# Patient Record
Sex: FEMALE | Race: WHITE | Employment: FULL TIME | URBAN - METROPOLITAN AREA
[De-identification: names, ages, dates, MRNs, and addresses within clinical notes are randomized per-mention and may not be internally consistent; named-entity substitution may affect disease eponyms.]

---

## 2017-03-20 ENCOUNTER — EMPLOYEE WELLNESS (OUTPATIENT)
Dept: OTHER | Age: 32
End: 2017-03-20

## 2017-03-20 LAB
CHOLESTEROL: 173 MG/DL
GLUCOSE BLD-MCNC: 97 MG/DL (ref 70–140)
HDLC SERPL-MCNC: 50 MG/DL
LDL CHOLESTEROL CALCULATED: 106 MG/DL
PATIENT FASTING?: YES
TRIGL SERPL-MCNC: 84 MG/DL

## 2017-06-30 ENCOUNTER — HOSPITAL ENCOUNTER (OUTPATIENT)
Dept: LAB | Age: 32
Discharge: HOME | End: 2017-06-30
Attending: OBSTETRICS & GYNECOLOGY | Admitting: OBSTETRICS & GYNECOLOGY

## 2017-07-01 LAB — HIV SCREEN: NON REACTIVE

## 2017-10-11 ENCOUNTER — HOSPITAL ENCOUNTER (OUTPATIENT)
Dept: LAB | Age: 32
Discharge: HOME | End: 2017-10-11
Attending: OBSTETRICS & GYNECOLOGY | Admitting: OBSTETRICS & GYNECOLOGY

## 2017-10-11 LAB
AMOUNT GLUCOSE GIVEN: 50 GRAMS
GLUCOSE TOLERANCE TEST 1 HOUR: 130 MG/DL
HCT VFR BLD CALC: 35.1 % (ref 37–47)
HEMOGLOBIN: 12.1 GM/DL (ref 12.5–16)
MCH RBC QN AUTO: 32.6 PG (ref 27–31)
MCHC RBC AUTO-ENTMCNC: 34.5 % (ref 32–36)
MCV RBC AUTO: 94.6 FL (ref 78–100)
PDW BLD-RTO: 12.9 % (ref 11.7–14.9)
PLATELET # BLD: 254 K/CU MM (ref 140–440)
PMV BLD AUTO: 10.6 FL (ref 7.5–11.1)
RBC # BLD: 3.71 M/CU MM (ref 4.2–5.4)
WBC # BLD: 11.6 K/CU MM (ref 4–10.5)

## 2017-12-27 ENCOUNTER — HOSPITAL ENCOUNTER (OUTPATIENT)
Dept: LAB | Age: 32
Discharge: HOME | End: 2017-12-27
Attending: OBSTETRICS & GYNECOLOGY | Admitting: OBSTETRICS & GYNECOLOGY

## 2017-12-27 ENCOUNTER — HOSPITAL ENCOUNTER (OUTPATIENT)
Dept: OTHER | Age: 32
Discharge: HOME | End: 2017-12-27
Attending: OBSTETRICS & GYNECOLOGY | Admitting: OBSTETRICS & GYNECOLOGY

## 2017-12-27 LAB
ALT SERPL-CCNC: 13 U/L (ref 10–40)
AST SERPL-CCNC: 20 IU/L (ref 15–37)
BASOPHILS ABSOLUTE: 0 K/CU MM
BASOPHILS RELATIVE PERCENT: 0.4 % (ref 0–1)
CREAT SERPL-MCNC: 0.6 MG/DL (ref 0.6–1.1)
DIFFERENTIAL TYPE: ABNORMAL
EOSINOPHILS ABSOLUTE: 0.1 K/CU MM
EOSINOPHILS RELATIVE PERCENT: 0.8 % (ref 0–3)
GFR AFRICAN AMERICAN: >60 ML/MIN/1.73M2
GFR NON-AFRICAN AMERICAN: >60 ML/MIN/1.73M2
HCT VFR BLD CALC: 36.5 % (ref 37–47)
HEMOGLOBIN: 12.2 GM/DL (ref 12.5–16)
IMMATURE NEUTROPHIL %: 0.6 % (ref 0–0.43)
LYMPHOCYTES ABSOLUTE: 1.4 K/CU MM
LYMPHOCYTES RELATIVE PERCENT: 12.9 % (ref 24–44)
MCH RBC QN AUTO: 31.9 PG (ref 27–31)
MCHC RBC AUTO-ENTMCNC: 33.4 % (ref 32–36)
MCV RBC AUTO: 95.3 FL (ref 78–100)
MONOCYTES ABSOLUTE: 0.9 K/CU MM
MONOCYTES RELATIVE PERCENT: 8.2 % (ref 0–4)
NUCLEATED RBC %: 0 %
PDW BLD-RTO: 13.2 % (ref 11.7–14.9)
PLATELET # BLD: 235 K/CU MM (ref 140–440)
PMV BLD AUTO: 11.9 FL (ref 7.5–11.1)
RBC # BLD: 3.83 M/CU MM (ref 4.2–5.4)
SEGMENTED NEUTROPHILS ABSOLUTE COUNT: 8.4 K/CU MM
SEGMENTED NEUTROPHILS RELATIVE PERCENT: 77.1 % (ref 36–66)
TOTAL IMMATURE NEUTOROPHIL: 0.06 K/CU MM
TOTAL NUCLEATED RBC: 0 K/CU MM
URIC ACID: 4.4 MG/DL (ref 2.6–6)
WBC # BLD: 10.9 K/CU MM (ref 4–10.5)

## 2018-01-01 LAB
CULTURE: NORMAL
REPORT STATUS: NORMAL
REQUEST PROBLEM: NORMAL
SPECIMEN: NORMAL

## 2018-01-04 ENCOUNTER — HOSPITAL ENCOUNTER (OUTPATIENT)
Dept: OTHER | Age: 33
Discharge: HOME | End: 2018-01-04
Attending: OBSTETRICS & GYNECOLOGY | Admitting: OBSTETRICS & GYNECOLOGY

## 2018-01-04 LAB — HEPATITIS B SURFACE ANTIGEN: NON REACTIVE

## 2018-01-05 ENCOUNTER — HOSPITAL ENCOUNTER (OUTPATIENT)
Dept: OTHER | Age: 33
Discharge: HOME | End: 2018-01-05
Attending: OBSTETRICS & GYNECOLOGY | Admitting: OBSTETRICS & GYNECOLOGY

## 2018-01-05 LAB — RPR: NON REACTIVE

## 2018-01-06 PROBLEM — O34.219 PREVIOUS CESAREAN DELIVERY AFFECTING PREGNANCY: Status: ACTIVE | Noted: 2018-01-06

## 2018-01-06 LAB — RUBELLA ANTIBODY IGG: 20.5

## 2018-03-20 VITALS — WEIGHT: 172 LBS | BODY MASS INDEX: 30.47 KG/M2

## 2018-04-20 ENCOUNTER — EMPLOYEE WELLNESS (OUTPATIENT)
Dept: INTERNAL MEDICINE CLINIC | Age: 33
End: 2018-04-20

## 2018-04-20 LAB
CHOLESTEROL: 167 MG/DL
GLUCOSE BLD-MCNC: 89 MG/DL (ref 70–99)
HDLC SERPL-MCNC: 65 MG/DL
LDL CHOLESTEROL CALCULATED: 94 MG/DL
PATIENT FASTING?: YES
TRIGL SERPL-MCNC: 42 MG/DL

## 2018-05-02 VITALS — BODY MASS INDEX: 28.67 KG/M2 | WEIGHT: 167 LBS

## 2019-03-13 ENCOUNTER — EMPLOYEE WELLNESS (OUTPATIENT)
Dept: OTHER | Age: 34
End: 2019-03-13

## 2019-03-13 LAB
CHOLESTEROL: 150 MG/DL
GLUCOSE BLD-MCNC: 93 MG/DL (ref 70–99)
HDLC SERPL-MCNC: 54 MG/DL
LDL CHOLESTEROL CALCULATED: 87 MG/DL
PATIENT FASTING?: YES
TRIGL SERPL-MCNC: 46 MG/DL

## 2019-03-20 VITALS — BODY MASS INDEX: 28.15 KG/M2 | WEIGHT: 164 LBS

## 2019-04-08 ENCOUNTER — HOSPITAL ENCOUNTER (OUTPATIENT)
Age: 34
Setting detail: SPECIMEN
Discharge: HOME | End: 2019-04-08
Payer: COMMERCIAL

## 2019-04-08 PROCEDURE — 88142 CYTOPATH C/V THIN LAYER: CPT

## 2019-04-08 PROCEDURE — 87624 HPV HI-RISK TYP POOLED RSLT: CPT

## 2019-04-13 LAB
HPV, HIGH RISK OTHER: NEGATIVE
HPV, HIGH RISK OTHER: NORMAL

## 2021-07-06 LAB
CHOLESTEROL: 186 MG/DL
GLUCOSE BLD-MCNC: 84 MG/DL (ref 70–99)
HDLC SERPL-MCNC: 60 MG/DL
LDL CHOLESTEROL CALCULATED: 108 MG/DL
PATIENT FASTING?: YES
TRIGL SERPL-MCNC: 90 MG/DL

## 2021-07-28 ENCOUNTER — OFFICE VISIT (OUTPATIENT)
Dept: FAMILY MEDICINE CLINIC | Age: 36
End: 2021-07-28
Payer: COMMERCIAL

## 2021-07-28 VITALS
HEIGHT: 64 IN | WEIGHT: 177 LBS | HEART RATE: 85 BPM | BODY MASS INDEX: 30.22 KG/M2 | TEMPERATURE: 97.2 F | DIASTOLIC BLOOD PRESSURE: 82 MMHG | OXYGEN SATURATION: 98 % | SYSTOLIC BLOOD PRESSURE: 132 MMHG

## 2021-07-28 DIAGNOSIS — M25.412 PAIN AND SWELLING OF LEFT SHOULDER: Primary | ICD-10-CM

## 2021-07-28 DIAGNOSIS — M25.512 PAIN AND SWELLING OF LEFT SHOULDER: Primary | ICD-10-CM

## 2021-07-28 PROCEDURE — 99203 OFFICE O/P NEW LOW 30 MIN: CPT | Performed by: NURSE PRACTITIONER

## 2021-07-28 RX ORDER — VITAMIN B COMPLEX
1 CAPSULE ORAL DAILY
COMMUNITY

## 2021-07-28 ASSESSMENT — ENCOUNTER SYMPTOMS
WHEEZING: 0
NAUSEA: 0
SHORTNESS OF BREATH: 0
ABDOMINAL PAIN: 0
BACK PAIN: 0
RECTAL PAIN: 0

## 2021-08-05 ENCOUNTER — HOSPITAL ENCOUNTER (OUTPATIENT)
Dept: ULTRASOUND IMAGING | Age: 36
Discharge: HOME | End: 2021-08-05
Payer: COMMERCIAL

## 2021-08-05 DIAGNOSIS — M25.512 PAIN AND SWELLING OF LEFT SHOULDER: ICD-10-CM

## 2021-08-05 DIAGNOSIS — M25.412 PAIN AND SWELLING OF LEFT SHOULDER: ICD-10-CM

## 2021-08-05 PROCEDURE — 76882 US LMTD JT/FCL EVL NVASC XTR: CPT

## 2021-08-18 ENCOUNTER — TELEPHONE (OUTPATIENT)
Dept: FAMILY MEDICINE CLINIC | Age: 36
End: 2021-08-18

## 2021-08-18 NOTE — TELEPHONE ENCOUNTER
----- Message from DEDRA Parnell - CNP sent at 8/18/2021  9:36 AM EDT -----  I will refer pt to gen surg for removal.  If she has a preference please let me know.

## 2021-08-19 DIAGNOSIS — D17.20 LIPOMA OF UPPER EXTREMITY, UNSPECIFIED LATERALITY: Primary | ICD-10-CM

## 2021-08-19 NOTE — TELEPHONE ENCOUNTER
Referral placed for Dr Kallie Burroughs for [place] office unless she is able to go to [facility].

## 2021-08-20 ENCOUNTER — TELEPHONE (OUTPATIENT)
Dept: SURGERY | Age: 36
End: 2021-08-20

## 2021-09-09 ENCOUNTER — OFFICE VISIT (OUTPATIENT)
Dept: SURGERY | Age: 36
End: 2021-09-09
Payer: COMMERCIAL

## 2021-09-09 VITALS
BODY MASS INDEX: 30.29 KG/M2 | HEIGHT: 64 IN | SYSTOLIC BLOOD PRESSURE: 122 MMHG | HEART RATE: 85 BPM | DIASTOLIC BLOOD PRESSURE: 80 MMHG | WEIGHT: 177.4 LBS | OXYGEN SATURATION: 95 %

## 2021-09-09 DIAGNOSIS — D17.22 LIPOMA OF LEFT UPPER EXTREMITY: Primary | ICD-10-CM

## 2021-09-09 PROCEDURE — 99203 OFFICE O/P NEW LOW 30 MIN: CPT | Performed by: SURGERY

## 2021-09-09 ASSESSMENT — PATIENT HEALTH QUESTIONNAIRE - PHQ9
SUM OF ALL RESPONSES TO PHQ QUESTIONS 1-9: 0
SUM OF ALL RESPONSES TO PHQ9 QUESTIONS 1 & 2: 0
1. LITTLE INTEREST OR PLEASURE IN DOING THINGS: 0
2. FEELING DOWN, DEPRESSED OR HOPELESS: 0

## 2021-09-15 ENCOUNTER — TELEPHONE (OUTPATIENT)
Dept: SURGERY | Age: 36
End: 2021-09-15

## 2021-09-15 DIAGNOSIS — Z01.818 PRE-OP TESTING: Primary | ICD-10-CM

## 2021-09-15 NOTE — TELEPHONE ENCOUNTER
SPOKE TO  [address] Meza (LEFT SHOULDER LIPOMA EXCISION) SCHEDULED @ [facility].  NOTIFIED OF DATES, TIMES AND LOCATION    PHONE ASSESSMENT   COVID - WILL BE DONE AT HOSPITAL LAB 9/24/21  SURGERY - 9/28/21 @ 830  P/O - 10/7/21 @ 1000    NPO AFTER MIDNIGHT  HOLD BLOOD THINNERS - NOT ON BLOOD THINNERS

## 2021-09-16 ENCOUNTER — TELEPHONE (OUTPATIENT)
Dept: SURGERY | Age: 36
End: 2021-09-16

## 2021-09-16 NOTE — TELEPHONE ENCOUNTER
Spoke with fabrice at [facility] CPT code 53-69-10-18 does not require prior authorization [facility]#[id number]

## 2021-09-22 ENCOUNTER — HOSPITAL ENCOUNTER (OUTPATIENT)
Age: 36
Discharge: HOME | End: 2021-09-22
Payer: COMMERCIAL

## 2021-09-22 PROCEDURE — U0003 INFECTIOUS AGENT DETECTION BY NUCLEIC ACID (DNA OR RNA); SEVERE ACUTE RESPIRATORY SYNDROME CORONAVIRUS 2 (SARS-COV-2) (CORONAVIRUS DISEASE [COVID-19]), AMPLIFIED PROBE TECHNIQUE, MAKING USE OF HIGH THROUGHPUT TECHNOLOGIES AS DESCRIBED BY CMS-2020-01-R: HCPCS

## 2021-09-22 PROCEDURE — U0005 INFEC AGEN DETEC AMPLI PROBE: HCPCS

## 2021-09-22 NOTE — PROGRESS NOTES
Surgery is on 9/28/2021 you will be called with an arrival time the day before surgery. 1. Do not eat or drink anything after midnight - unless instructed by your doctor prior to surgery. This includes                   no water, chewing gum or mints. 2. Follow your directions as prescribed by the doctor for your procedure and medications. 3. Check with your Doctor regarding stopping Plavix, Coumadin, Lovenox,Effient,Pradaxa,Xarelto, Fragmin or other blood thinners and                   follow their instructions. 4. Do not smoke, and do not drink any alcoholic beverages 24 hours prior to surgery. This includes NA Beer. 5. You may brush your teeth and gargle the morning of surgery. DO NOT SWALLOW WATER   6. You MUST make arrangements for a responsible adult to take you home after your surgery and be able to check on you every couple                   hours for the day. You will not be allowed to leave alone or drive yourself home. It is strongly suggested someone stay with you the first 24                   hrs. Your surgery will be cancelled if you do not have a ride home. 7. Please wear simple, loose fitting clothing to the hospital.  Leonor Cabrera not bring valuables (money, credit cards, checkbooks, etc.) Do not wear any                   makeup (including no eye makeup) or nail polish on your fingers or toes. 8. DO NOT wear any jewelry or piercings on day of surgery. All body piercing jewelry must be removed. 9. If you have dentures, they will be removed before going to the OR; we will provide you a container. If you wear contact lenses or glasses,                  they will be removed; please bring a case for them. 10. If you  have a Living Will and Durable Power of  for Healthcare, please bring in a copy. 11. Please bring picture ID,  insurance card, paperwork from the doctors office    (H & P, Consent, & card for implantable devices). 12. Take a shower the night before or morning of your procedure, do not apply any lotion, oil or powder. 13. Wear a mask covering your nose & mouth when entering the hospital. Have your covid-19 test performed within 2-7 days of your                  surgery. Quarantine yourself after the test until after your surgery.

## 2021-09-23 LAB
SARS-COV-2: NOT DETECTED
SOURCE: NORMAL

## 2021-09-27 ENCOUNTER — ANESTHESIA EVENT (OUTPATIENT)
Dept: OPERATING ROOM | Age: 36
End: 2021-09-27
Payer: COMMERCIAL

## 2021-09-27 ASSESSMENT — ENCOUNTER SYMPTOMS
CONSTIPATION: 0
SORE THROAT: 0
ANAL BLEEDING: 0
APNEA: 0
BACK PAIN: 0
COLOR CHANGE: 0
EYE ITCHING: 0
EYE REDNESS: 0
CHOKING: 0
RECTAL PAIN: 0
PHOTOPHOBIA: 0
STRIDOR: 0

## 2021-09-27 NOTE — H&P
Problem Relation Age of Onset    High Blood Pressure Father     Vision Loss Father     Heart Disease Father           Review of Systems  Review of Systems   Constitutional: Negative for chills and fever. HENT: Negative for ear pain, mouth sores, sore throat and tinnitus. Eyes: Negative for photophobia, redness and itching. Respiratory: Negative for apnea, choking and stridor. Cardiovascular: Negative for chest pain and palpitations. Gastrointestinal: Negative for anal bleeding, constipation and rectal pain. Endocrine: Negative for polydipsia. Genitourinary: Negative for enuresis, flank pain and hematuria. Musculoskeletal: Positive for arthralgias and joint swelling. Negative for back pain and myalgias. Skin: Negative for color change and pallor. Allergic/Immunologic: Negative for environmental allergies. Neurological: Negative for syncope and speech difficulty. Psychiatric/Behavioral: Negative for confusion and hallucinations. Objective:     No data found. Physical Exam  Constitutional:       Appearance: She is well-developed. HENT:      Head: Normocephalic. Eyes:      Pupils: Pupils are equal, round, and reactive to light. Cardiovascular:      Rate and Rhythm: Normal rate. Pulmonary:      Effort: Pulmonary effort is normal.   Abdominal:      General: There is no distension. Palpations: Abdomen is soft. There is no mass. Tenderness: There is no abdominal tenderness. There is no guarding or rebound. Musculoskeletal:         General: Normal range of motion. Cervical back: Normal range of motion and neck supple. Comments: Lipoma to left posterior shoulder   Skin:     General: Skin is warm. Neurological:      Mental Status: She is alert and oriented to person, place, and time.          Data Review  CBC:   Lab Results   Component Value Date    WBC 12.9 01/07/2018    RBC 3.20 01/07/2018     BMP:   Lab Results   Component Value Date    GLUCOSE 84 07/06/2021    CREATININE 0.6 12/27/2017       Assessment:     Left shoulder lipoma    Plan: We will proceed with left shoulder lipoma excision under MAC. Risks, benefits and alternatives of the procedure have been discussed with the patient who is elected to proceed. The patient was counseled at length about the risks of amanda Covid-19 during their perioperative period and any recovery window from their procedure. The patient was made aware that amanda Covid-19  may worsen their prognosis for recovering from their procedure  and lend to a higher morbidity and/or mortality risk. All material risks, benefits, and reasonable alternatives including postponing the procedure were discussed. The patient does wish to proceed with the procedure at this time.           Em Pozo PA-C

## 2021-09-27 NOTE — ANESTHESIA PRE PROCEDURE
Department of Anesthesiology  Preprocedure Note       Name:  Kaley Choi   Age:  38 y.o.  :  1985                                          MRN:  [id number]         Date:  2021      Surgeon: Evangelista Herrera):  Marin Hsu MD    Procedure: Procedure(s):  LEFT SHOULDER LIPOMA EXCISION    Medications prior to admission:   Prior to Admission medications    Medication Sig Start Date End Date Taking? Authorizing Provider   Multiple Vitamin (MULTI-VITAMIN DAILY PO) Take by mouth    Historical Provider, MD   b complex vitamins capsule Take 1 capsule by mouth daily    Historical Provider, MD   ibuprofen (ADVIL;MOTRIN) 800 MG tablet Take 1 tablet by mouth every 8 hours as needed for Pain 18   David Shaw MD   calcium carbonate (TUMS) 500 MG chewable tablet Take 1 tablet by mouth daily    Historical Provider, MD   prenatal vitamin (PRENATAL-S) 27-0.8 MG TABS Take 1 tablet by mouth daily. Patient not taking: Reported on 2021    Historical Provider, MD       Current medications:    No current facility-administered medications for this encounter. Current Outpatient Medications   Medication Sig Dispense Refill    Multiple Vitamin (MULTI-VITAMIN DAILY PO) Take by mouth      b complex vitamins capsule Take 1 capsule by mouth daily      ibuprofen (ADVIL;MOTRIN) 800 MG tablet Take 1 tablet by mouth every 8 hours as needed for Pain 90 tablet 1    calcium carbonate (TUMS) 500 MG chewable tablet Take 1 tablet by mouth daily      prenatal vitamin (PRENATAL-S) 27-0.8 MG TABS Take 1 tablet by mouth daily. (Patient not taking: Reported on 2021)         Allergies:  No Known Allergies    Problem List:    Patient Active Problem List   Diagnosis Code    Supervision of normal first pregnancy Z34.00    Labor and delivery, indication for care O75.9    Previous  delivery affecting pregnancy O34.219       Past Medical History:  History reviewed. No pertinent past medical history.     Past summary reviewed  Airway:         Dental:          Pulmonary:                             ROS comment: Former Smoker   Cardiovascular:Negative CV ROS             Beta Blocker:  Not on Beta Blocker         Neuro/Psych:   Negative Neuro/Psych ROS              GI/Hepatic/Renal: Neg GI/Hepatic/Renal ROS            Endo/Other: Negative Endo/Other ROS                    Abdominal:             Vascular: negative vascular ROS. Other Findings:             Anesthesia Plan      MAC     ASA 1       Induction: intravenous. DEDRA Silva - CRNA   9/27/2021         Pre Anesthesia Assessment complete.  Chart reviewed on 9/27/2021

## 2021-09-28 ENCOUNTER — HOSPITAL ENCOUNTER (OUTPATIENT)
Age: 36
Setting detail: OUTPATIENT SURGERY
Discharge: HOME | End: 2021-09-28
Attending: SURGERY | Admitting: SURGERY
Payer: COMMERCIAL

## 2021-09-28 ENCOUNTER — ANESTHESIA (OUTPATIENT)
Dept: OPERATING ROOM | Age: 36
End: 2021-09-28
Payer: COMMERCIAL

## 2021-09-28 VITALS
DIASTOLIC BLOOD PRESSURE: 79 MMHG | BODY MASS INDEX: 30.22 KG/M2 | HEART RATE: 86 BPM | OXYGEN SATURATION: 100 % | RESPIRATION RATE: 18 BRPM | WEIGHT: 177 LBS | HEIGHT: 64 IN | SYSTOLIC BLOOD PRESSURE: 130 MMHG | TEMPERATURE: 98.1 F

## 2021-09-28 VITALS — DIASTOLIC BLOOD PRESSURE: 82 MMHG | SYSTOLIC BLOOD PRESSURE: 117 MMHG | OXYGEN SATURATION: 91 %

## 2021-09-28 DIAGNOSIS — D17.22 LIPOMA OF LEFT SHOULDER: ICD-10-CM

## 2021-09-28 LAB — PREGNANCY TEST URINE, POC: NEGATIVE

## 2021-09-28 PROCEDURE — 23073 EXC SHOULDER TUM DEEP 5 CM/>: CPT | Performed by: PHYSICIAN ASSISTANT

## 2021-09-28 PROCEDURE — 7100000010 HC PHASE II RECOVERY - FIRST 15 MIN: Performed by: SURGERY

## 2021-09-28 PROCEDURE — 3600000012 HC SURGERY LEVEL 2 ADDTL 15MIN: Performed by: SURGERY

## 2021-09-28 PROCEDURE — 81025 URINE PREGNANCY TEST: CPT

## 2021-09-28 PROCEDURE — 23073 EXC SHOULDER TUM DEEP 5 CM/>: CPT | Performed by: SURGERY

## 2021-09-28 PROCEDURE — 3700000000 HC ANESTHESIA ATTENDED CARE: Performed by: SURGERY

## 2021-09-28 PROCEDURE — 3700000001 HC ADD 15 MINUTES (ANESTHESIA): Performed by: SURGERY

## 2021-09-28 PROCEDURE — APPNB45 APP NON BILLABLE 31-45 MINUTES: Performed by: PHYSICIAN ASSISTANT

## 2021-09-28 PROCEDURE — 2580000003 HC RX 258: Performed by: ANESTHESIOLOGY

## 2021-09-28 PROCEDURE — 2709999900 HC NON-CHARGEABLE SUPPLY: Performed by: SURGERY

## 2021-09-28 PROCEDURE — 2500000003 HC RX 250 WO HCPCS: Performed by: NURSE ANESTHETIST, CERTIFIED REGISTERED

## 2021-09-28 PROCEDURE — 2500000003 HC RX 250 WO HCPCS: Performed by: SURGERY

## 2021-09-28 PROCEDURE — 7100000011 HC PHASE II RECOVERY - ADDTL 15 MIN: Performed by: SURGERY

## 2021-09-28 PROCEDURE — 6360000002 HC RX W HCPCS: Performed by: NURSE ANESTHETIST, CERTIFIED REGISTERED

## 2021-09-28 PROCEDURE — 88304 TISSUE EXAM BY PATHOLOGIST: CPT | Performed by: PATHOLOGY

## 2021-09-28 PROCEDURE — 3600000002 HC SURGERY LEVEL 2 BASE: Performed by: SURGERY

## 2021-09-28 RX ORDER — SODIUM CHLORIDE, SODIUM LACTATE, POTASSIUM CHLORIDE, CALCIUM CHLORIDE 600; 310; 30; 20 MG/100ML; MG/100ML; MG/100ML; MG/100ML
INJECTION, SOLUTION INTRAVENOUS CONTINUOUS
Status: DISCONTINUED | OUTPATIENT
Start: 2021-09-28 | End: 2021-09-28 | Stop reason: HOSPADM

## 2021-09-28 RX ORDER — FENTANYL CITRATE 50 UG/ML
INJECTION, SOLUTION INTRAMUSCULAR; INTRAVENOUS PRN
Status: DISCONTINUED | OUTPATIENT
Start: 2021-09-28 | End: 2021-09-28 | Stop reason: SDUPTHER

## 2021-09-28 RX ORDER — ACETAMINOPHEN AND CODEINE PHOSPHATE 300; 30 MG/1; MG/1
1 TABLET ORAL EVERY 6 HOURS PRN
Qty: 5 TABLET | Refills: 0 | Status: SHIPPED | OUTPATIENT
Start: 2021-09-28 | End: 2021-10-01

## 2021-09-28 RX ORDER — LIDOCAINE HYDROCHLORIDE 10 MG/ML
INJECTION, SOLUTION EPIDURAL; INFILTRATION; INTRACAUDAL; PERINEURAL
Status: COMPLETED | OUTPATIENT
Start: 2021-09-28 | End: 2021-09-28

## 2021-09-28 RX ORDER — PROPOFOL 10 MG/ML
INJECTION, EMULSION INTRAVENOUS PRN
Status: DISCONTINUED | OUTPATIENT
Start: 2021-09-28 | End: 2021-09-28 | Stop reason: SDUPTHER

## 2021-09-28 RX ORDER — LIDOCAINE HYDROCHLORIDE 20 MG/ML
INJECTION, SOLUTION INTRAVENOUS PRN
Status: DISCONTINUED | OUTPATIENT
Start: 2021-09-28 | End: 2021-09-28 | Stop reason: SDUPTHER

## 2021-09-28 RX ORDER — KETAMINE HCL 50MG/ML(1)
SYRINGE (ML) INTRAVENOUS PRN
Status: DISCONTINUED | OUTPATIENT
Start: 2021-09-28 | End: 2021-09-28 | Stop reason: SDUPTHER

## 2021-09-28 RX ADMIN — PROPOFOL 400 MG: 10 INJECTION, EMULSION INTRAVENOUS at 08:12

## 2021-09-28 RX ADMIN — SODIUM CHLORIDE, POTASSIUM CHLORIDE, SODIUM LACTATE AND CALCIUM CHLORIDE: 600; 310; 30; 20 INJECTION, SOLUTION INTRAVENOUS at 07:34

## 2021-09-28 RX ADMIN — LIDOCAINE HYDROCHLORIDE 100 MG: 20 INJECTION, SOLUTION INTRAVENOUS at 08:12

## 2021-09-28 RX ADMIN — FENTANYL CITRATE 25 MCG: 50 INJECTION, SOLUTION INTRAMUSCULAR; INTRAVENOUS at 08:28

## 2021-09-28 RX ADMIN — Medication 25 MG: at 08:28

## 2021-09-28 RX ADMIN — FENTANYL CITRATE 50 MCG: 50 INJECTION, SOLUTION INTRAMUSCULAR; INTRAVENOUS at 08:12

## 2021-09-28 RX ADMIN — FENTANYL CITRATE 25 MCG: 50 INJECTION, SOLUTION INTRAMUSCULAR; INTRAVENOUS at 08:20

## 2021-09-28 RX ADMIN — Medication 25 MG: at 08:23

## 2021-09-28 ASSESSMENT — PULMONARY FUNCTION TESTS
PIF_VALUE: 0
PIF_VALUE: 1
PIF_VALUE: 0
PIF_VALUE: 0
PIF_VALUE: 1
PIF_VALUE: 0
PIF_VALUE: 1
PIF_VALUE: 0
PIF_VALUE: 0
PIF_VALUE: 1
PIF_VALUE: 1
PIF_VALUE: 0
PIF_VALUE: 1
PIF_VALUE: 0
PIF_VALUE: 0
PIF_VALUE: 1
PIF_VALUE: 1
PIF_VALUE: 0
PIF_VALUE: 1
PIF_VALUE: 0
PIF_VALUE: 1
PIF_VALUE: 0
PIF_VALUE: 0

## 2021-09-28 ASSESSMENT — PAIN SCALES - GENERAL
PAINLEVEL_OUTOF10: 0
PAINLEVEL_OUTOF10: 0

## 2021-09-28 ASSESSMENT — PAIN - FUNCTIONAL ASSESSMENT: PAIN_FUNCTIONAL_ASSESSMENT: 0-10

## 2021-09-28 NOTE — PROGRESS NOTES
0850 Pt. Brought back to unit from OR. Report received from [facility], [facility]. Vitals obtained and WNL. Surgical site is C/D/I and PORTER. Spouse at bedside. Pt. Drinking water appropriately. Educated on use of call light, call light in reach. 0900 Handoff given to SYMONE Domingo.

## 2021-09-28 NOTE — ANESTHESIA PRE PROCEDURE
Department of Anesthesiology  Preprocedure Note       Name:  Jovana Murray   Age:  38 y.o.  :  1985                                          MRN:  [id number]         Date:  2021      Surgeon: Justus Malagon):  Ron Wasserman MD    Procedure: Procedure(s):  LEFT SHOULDER LIPOMA EXCISION    Medications prior to admission:   Prior to Admission medications    Medication Sig Start Date End Date Taking? Authorizing Provider   Multiple Vitamin (MULTI-VITAMIN DAILY PO) Take by mouth   Yes Historical Provider, MD   b complex vitamins capsule Take 1 capsule by mouth daily   Yes Historical Provider, MD   ibuprofen (ADVIL;MOTRIN) 800 MG tablet Take 1 tablet by mouth every 8 hours as needed for Pain 18  Yes Debbie Olivia MD   calcium carbonate (TUMS) 500 MG chewable tablet Take 1 tablet by mouth daily   Yes Historical Provider, MD   prenatal vitamin (PRENATAL-S) 27-0.8 MG TABS Take 1 tablet by mouth daily. Patient not taking: Reported on 2021    Historical Provider, MD       Current medications:    Current Facility-Administered Medications   Medication Dose Route Frequency Provider Last Rate Last Admin    lactated ringers infusion   IntraVENous Continuous [place] Cassis,  mL/hr at 21 0734 New Bag at 21 0734       Allergies:  No Known Allergies    Problem List:    Patient Active Problem List   Diagnosis Code    Supervision of normal first pregnancy Z34.00    Labor and delivery, indication for care O75.9    Previous  delivery affecting pregnancy O34.219       Past Medical History:  History reviewed. No pertinent past medical history. Past Surgical History:        Procedure Laterality Date    CERVIX BIOPSY       SECTION, LOW TRANSVERSE  2014    WISDOM TOOTH EXTRACTION         Social History:    Social History     Tobacco Use    Smoking status: Former Smoker    Smokeless tobacco: Never Used   Substance Use Topics    Alcohol use:  Yes Comment: occas                                Counseling given: Not Answered      Vital Signs (Current):   Vitals:    09/22/21 1101 09/28/21 0715   BP:  132/89   Pulse:  92   Resp:  18   Temp:  37.1 °C (98.7 °F)   TempSrc:  Tympanic   SpO2:  98%   Weight: 177 lb (80.3 kg) 177 lb (80.3 kg)   Height: 5' 4\" (1.626 m) 5' 4\" (1.626 m)                                              BP Readings from Last 3 Encounters:   09/28/21 132/89   09/09/21 122/80   07/28/21 132/82       NPO Status: Time of last liquid consumption: 2230                        Time of last solid consumption: 2100                        Date of last liquid consumption: 09/27/21                        Date of last solid food consumption: 09/27/21    BMI:   Wt Readings from Last 3 Encounters:   09/28/21 177 lb (80.3 kg)   09/09/21 177 lb 6.4 oz (80.5 kg)   07/28/21 177 lb (80.3 kg)     Body mass index is 30.38 kg/m². CBC:   Lab Results   Component Value Date    WBC 12.9 01/07/2018    RBC 3.20 01/07/2018    HGB 10.4 01/07/2018    HCT 31.0 01/07/2018    MCV 96.9 01/07/2018    RDW 13.2 01/07/2018     01/07/2018       CMP:   Lab Results   Component Value Date    CREATININE 0.6 12/27/2017    GFRAA >60 12/27/2017    LABGLOM >60 12/27/2017    GLUCOSE 84 07/06/2021    AST 20 12/27/2017    ALT 13 12/27/2017       POC Tests: No results for input(s): POCGLU, POCNA, POCK, POCCL, POCBUN, POCHEMO, POCHCT in the last 72 hours.     Coags: No results found for: PROTIME, INR, APTT    HCG (If Applicable):   Lab Results   Component Value Date    PREGTESTUR NEGATIVE 09/28/2021        ABGs: No results found for: PHART, PO2ART, AVA2LUP, POU1FDG, BEART, L3UWHPGL     Type & Screen (If Applicable):  No results found for: LABABO, LABRH    Drug/Infectious Status (If Applicable):  No results found for: HIV, HEPCAB    COVID-19 Screening (If Applicable):   Lab Results   Component Value Date    COVID19 NOT DETECTED 09/22/2021           Anesthesia Evaluation  Patient summary reviewed  Airway: Mallampati: II  TM distance: >3 FB   Neck ROM: full  Mouth opening: > = 3 FB Dental:          Pulmonary:normal exam                              ROS comment: Former Smoker   Cardiovascular:Negative CV ROS             Beta Blocker:  Not on Beta Blocker         Neuro/Psych:   Negative Neuro/Psych ROS              GI/Hepatic/Renal: Neg GI/Hepatic/Renal ROS            Endo/Other: Negative Endo/Other ROS                    Abdominal:             Vascular: negative vascular ROS. Other Findings:             Anesthesia Plan      MAC     ASA 1       Induction: intravenous. Anesthetic plan and risks discussed with patient. DEDRA Rosas - CRNA   9/28/2021         Pre Anesthesia Assessment complete.  Chart reviewed on 9/28/2021

## 2021-09-28 NOTE — ANESTHESIA POSTPROCEDURE EVALUATION
Department of Anesthesiology  Postprocedure Note    Patient: Marge Norman  MRN: [id number]  YOB: 1985  Date of evaluation: 9/28/2021  Time:  8:57 AM     Procedure Summary     Date: 09/28/21 Room / Location: [address]    Anesthesia Start: [address] Anesthesia Stop: 5238    Procedure: LEFT SHOULDER LIPOMA EXCISION (Left Shoulder) Diagnosis:       Lipoma of left shoulder      (Lipoma of left shoulder [D17.22])    Surgeons: Daly Arcos MD Responsible Provider: Radha Contreras MD    Anesthesia Type: MAC ASA Status: 1          Anesthesia Type: MAC    Jodie Phase I:      Jodie Phase II:      Last vitals: Reviewed and per EMR flowsheets.        Anesthesia Post Evaluation    Patient location during evaluation: bedside  Patient participation: complete - patient participated  Level of consciousness: awake and alert  Pain score: 0  Airway patency: patent  Nausea & Vomiting: no nausea and no vomiting  Complications: no  Cardiovascular status: blood pressure returned to baseline  Respiratory status: acceptable  Hydration status: euvolemic

## 2021-09-28 NOTE — BRIEF OP NOTE
Brief Postoperative Note      Patient: Kaley Choi  YOB: 1985  MRN: [id number]    Date of Procedure: 9/28/2021    Pre-Op Diagnosis: Lipoma of left shoulder [D17.22]    Post-Op Diagnosis: Same       Procedure(s):  LEFT SHOULDER LIPOMA EXCISION    Surgeon(s):  Marin Hsu MD    Assistant:  Physician Assistant: Cathie Lowe PA-C    Anesthesia: Monitor Anesthesia Care    Estimated Blood Loss (mL): Minimal    Complications: None    Specimens:   ID Type Source Tests Collected by Time Destination   A : LEFT SHOULDER LIPOMA Tissue Tissue SURGICAL PATHOLOGY Marin Hsu MD 9/28/2021 3174        Implants:  * No implants in log *      Drains: * No LDAs found *    Findings: As Above    Electronically signed by Cathie Lowe PA-C on 9/28/2021 at 8:49 AM

## 2021-09-28 NOTE — PROGRESS NOTES
0900-report from [facility], pt alert, denies further need   0909- discharge instructions reviewed w/ pt and pt family, no questions at this time   5- pt dressing   1415- pt discharged via car w/ family member driving

## 2021-09-29 NOTE — OP NOTE
Aaron Granados  1985 9/29/21        Pre-operative Diagnosis:  Large left shoulder lipoma    Post-operative Diagnosis:  Same    Procedure:   1. Excision of lipoma from left shoulder measuring 13  Cm subfascial     2. Two layer closure 13 cm wound    Surgeon: Ayana Garza MD      First Assistant: Mary Stern PA-C  The  Use of a first assistant was necessary for the proper positioning, prepping, and draping of the patient, as well as the safe and expeditious execution of the case and closure of skin and subcutaneous tissues. Anesthesia: MAC /lidocaine    ASA Class: 2    Findings: same    Estimated Blood Loss:  Minimal                  Specimens: large lipoma             Complications:  None; patient tolerated the procedure well. Disposition: PACU - hemodynamically stable. Condition: stable      The patient was seen again in the Holding Room. The risks, benefits, complications, treatment options, and expected outcomes were discussed with the patient. There was concurrence with the proposed plan, and informed consent was obtained. The site of surgery was properly noted/marked. The patient was taken to the Operating Room, identified as Aaron Granados, and the procedure verified. A Time Out was held and the above information confirmed. The patient was brought to the operating room and positioned supine. After induction of anesthesia, the  Left shoulder and back was prepped and draped in standard sterile fashion. 1% lidocaine was infiltrated and an elliptical incision was made to remove the large subfascial lipoma with surrounding normal tissue. The specimen measured 13 cm leaving 13 cm wound defect. Hemostasis was acquired using Bovie cautery. The wound irrigated and closed in two layers using 3-0 Vicryl for the deep dermal tissue and then interuppted 4-0 vicryl for the skin.       Ayana Garza MD

## 2021-10-04 ENCOUNTER — TELEPHONE (OUTPATIENT)
Dept: SURGERY | Age: 36
End: 2021-10-04

## 2021-10-07 ENCOUNTER — OFFICE VISIT (OUTPATIENT)
Dept: SURGERY | Age: 36
End: 2021-10-07

## 2021-10-07 VITALS
TEMPERATURE: 98.4 F | HEART RATE: 74 BPM | DIASTOLIC BLOOD PRESSURE: 80 MMHG | OXYGEN SATURATION: 98 % | SYSTOLIC BLOOD PRESSURE: 120 MMHG

## 2021-10-07 DIAGNOSIS — D17.22 LIPOMA OF LEFT UPPER EXTREMITY: Primary | ICD-10-CM

## 2021-10-07 PROCEDURE — 99024 POSTOP FOLLOW-UP VISIT: CPT | Performed by: PHYSICIAN ASSISTANT

## 2021-10-07 PROCEDURE — APPNB30 APP NON BILLABLE TIME 0-30 MINS: Performed by: PHYSICIAN ASSISTANT

## 2021-10-12 ASSESSMENT — ENCOUNTER SYMPTOMS
STRIDOR: 0
CHOKING: 0
COLOR CHANGE: 0
ANAL BLEEDING: 0
EYE REDNESS: 0
BACK PAIN: 0
EYE ITCHING: 0
PHOTOPHOBIA: 0
CONSTIPATION: 0
SORE THROAT: 0
APNEA: 0
RECTAL PAIN: 0

## 2021-10-12 NOTE — PROGRESS NOTES
Chief Complaint   Patient presents with    New Patient     lipoma of upper extremity, back side of left shoulder ref. . SUBJECTIVE:  HPI: Patient is herewith complaints of lipoma of the left shoulder area. Patient has had this for over 3 months and has been growing. Certain movements cause pain. Patient denies any weight loss or family history of sarcoma. He would like to have this removed and is causing some discomfort with shoulder mobility. .    I have reviewed the patient's(pertinent information to this visit) medical history, family history(scanned in  the [address] [place] [address] under \"patient questioner\"), social history and review of systems with the patient today in the office. Past Surgical History:   Procedure Laterality Date    CERVIX BIOPSY       SECTION, LOW TRANSVERSE  2014    SHOULDER SURGERY Left 2021    LEFT SHOULDER LIPOMA EXCISION performed by Lis Hammond MD at [address]       No past medical history on file.   Family History   Problem Relation Age of Onset    High Blood Pressure Father     Vision Loss Father     Heart Disease Father      Social History     Socioeconomic History    Marital status:      Spouse name: Not on file    Number of children: Not on file    Years of education: Not on file    Highest education level: Not on file   Occupational History    Not on file   Tobacco Use    Smoking status: Former Smoker    Smokeless tobacco: Never Used   Substance and Sexual Activity    Alcohol use: Yes     Comment: occas    Drug use: No    Sexual activity: Yes   Other Topics Concern    Not on file   Social History Narrative    Not on file     Social Determinants of Health     Financial Resource Strain:     Difficulty of Paying Living Expenses:    Food Insecurity:     Worried About Running Out of Food in the Last Year:     [address] in the Last Year:    Transportation Needs:     Lack of Transportation (Medical):  Lack of Transportation (Non-Medical):    Physical Activity:     Days of Exercise per Week:     Minutes of Exercise per Session:    Stress:     Feeling of Stress :    Social Connections:     Frequency of Communication with Friends and Family:     Frequency of Social Gatherings with Friends and Family:     Attends [employer] Services:     Active Member of Clubs or Organizations:     Attends Club or Organization Meetings:     Marital Status:    Intimate Partner Violence:     Fear of Current or Ex-Partner:     Emotionally Abused:     Physically Abused:     Sexually Abused:        Current Outpatient Medications   Medication Sig Dispense Refill    Multiple Vitamin (MULTI-VITAMIN DAILY PO) Take by mouth      b complex vitamins capsule Take 1 capsule by mouth daily      ibuprofen (ADVIL;MOTRIN) 800 MG tablet Take 1 tablet by mouth every 8 hours as needed for Pain 90 tablet 1    calcium carbonate (TUMS) 500 MG chewable tablet Take 1 tablet by mouth daily      prenatal vitamin (PRENATAL-S) 27-0.8 MG TABS Take 1 tablet by mouth daily. (Patient not taking: Reported on 7/28/2021)       No current facility-administered medications for this visit. No Known Allergies    Review of Systems:       Review of Systems   Constitutional: Negative for chills and fever. HENT: Negative for ear pain, mouth sores, sore throat and tinnitus. Eyes: Negative for photophobia, redness and itching. Respiratory: Negative for apnea, choking and stridor. Cardiovascular: Negative for chest pain and palpitations. Gastrointestinal: Negative for anal bleeding, constipation and rectal pain. Endocrine: Negative for polydipsia. Genitourinary: Negative for enuresis, flank pain and hematuria. Musculoskeletal: Negative for back pain, joint swelling and myalgias. Skin: Negative for color change and pallor. Allergic/Immunologic: Negative for environmental allergies.    Neurological: Negative for syncope and speech difficulty. Psychiatric/Behavioral: Negative for confusion and hallucinations. OBJECTIVE:  Physical Exam:    /80   Pulse 85   Ht 5' 4\" (1.626 m)   Wt 177 lb 6.4 oz (80.5 kg)   LMP 08/28/2021   SpO2 95%   BMI 30.45 kg/m²      Physical Exam  Constitutional:       Appearance: She is well-developed. HENT:      Head: Normocephalic. Eyes:      Pupils: Pupils are equal, round, and reactive to light. Cardiovascular:      Rate and Rhythm: Normal rate. Pulmonary:      Effort: Pulmonary effort is normal.   Abdominal:      General: There is no distension. Palpations: Abdomen is soft. There is no mass. Tenderness: There is no abdominal tenderness. There is no guarding or rebound. Musculoskeletal:         General: Normal range of motion. Arms:       Cervical back: Normal range of motion and neck supple. Skin:     General: Skin is warm. Neurological:      Mental Status: She is alert and oriented to person, place, and time. ASSESSMENT:  1. Lipoma of left upper extremity          PLAN:  Treatment: We will proceed with excision of left shoulder lipoma under monitored anesthesia. .  Patient counseled on risks, benefits, and alternatives of treatment plan at length today. Patient states an understanding and willingness to proceed with plan. No orders of the defined types were placed in this encounter. No orders of the defined types were placed in this encounter. Follow Up:  No follow-ups on file.       Mortimer Dawson, MD

## 2021-10-16 NOTE — PROGRESS NOTES
Chief Complaint   Patient presents with    Post-Op Check     PO left shoulder lipoma @ [facility]         SUBJECTIVE:  Patient presents today for her 1st post op visit following left shoulder lipoma excision. Pt reports that pain is none. Incisions: min bruising and no discharge. Some residual surgical glue intact. Past Surgical History:   Procedure Laterality Date    CERVIX BIOPSY       SECTION, LOW TRANSVERSE  2014    SHOULDER SURGERY Left 2021    LEFT SHOULDER LIPOMA EXCISION performed by Josh Jonas MD at [address]       No past medical history on file. Family History   Problem Relation Age of Onset    High Blood Pressure Father     Vision Loss Father     Heart Disease Father      Social History     Socioeconomic History    Marital status:      Spouse name: Not on file    Number of children: Not on file    Years of education: Not on file    Highest education level: Not on file   Occupational History    Not on file   Tobacco Use    Smoking status: Former Smoker    Smokeless tobacco: Never Used   Substance and Sexual Activity    Alcohol use: Yes     Comment: occas    Drug use: No    Sexual activity: Yes   Other Topics Concern    Not on file   Social History Narrative    Not on file     Social Determinants of Health     Financial Resource Strain:     Difficulty of Paying Living Expenses:    Food Insecurity:     Worried About Running Out of Food in the Last Year:     [address] in the Last Year:    Transportation Needs:     Lack of Transportation (Medical):      Lack of Transportation (Non-Medical):    Physical Activity:     Days of Exercise per Week:     Minutes of Exercise per Session:    Stress:     Feeling of Stress :    Social Connections:     Frequency of Communication with Friends and Family:     Frequency of Social Gatherings with Friends and Family:     Attends [employer] Services:     Active Member of Clubs or Organizations:     Attends Club or Organization Meetings:     Marital Status:    Intimate Partner Violence:     Fear of Current or Ex-Partner:     Emotionally Abused:     Physically Abused:     Sexually Abused:        OBJECTIVE:  Physical Exam  Constitutional:       Appearance: She is well-developed. HENT:      Head: Normocephalic. Eyes:      Pupils: Pupils are equal, round, and reactive to light. Cardiovascular:      Rate and Rhythm: Normal rate. Pulmonary:      Effort: Pulmonary effort is normal.   Abdominal:      General: There is no distension. Palpations: Abdomen is soft. There is no mass. Tenderness: There is no abdominal tenderness. There is no guarding or rebound. Musculoskeletal:         General: Normal range of motion. Arms:       Cervical back: Normal range of motion and neck supple. Comments: Left posterior shoulder incision well-healed with some residual surgical glue intact   Skin:     General: Skin is warm. Neurological:      Mental Status: She is alert and oriented to person, place, and time. Path reveals:   Final Pathologic Diagnosis:   Soft tissue, left shoulder mass, resection:   -     Mature adipose tissue, consistent with lipoma. ASSESSMENT:  Patient doing well on this post operative check. Wounds well healed. 1. Lipoma of left upper extremity        PLAN:  Normal activities with no limitations. Work: Patient is back at work and tolerating well. No orders of the defined types were placed in this encounter. No orders of the defined types were placed in this encounter. Follow Up: Return if symptoms worsen or fail to improve.     Angela Jones PA-C

## 2022-06-13 LAB
CHOLESTEROL: 155 MG/DL
GLUCOSE BLD-MCNC: 88 MG/DL (ref 70–99)
HDLC SERPL-MCNC: 45 MG/DL
LDL CHOLESTEROL CALCULATED: 96 MG/DL
PATIENT FASTING?: YES
TRIGL SERPL-MCNC: 68 MG/DL

## 2022-11-07 ENCOUNTER — OFFICE VISIT (OUTPATIENT)
Dept: OBGYN | Age: 37
End: 2022-11-07
Payer: COMMERCIAL

## 2022-11-07 ENCOUNTER — HOSPITAL ENCOUNTER (OUTPATIENT)
Age: 37
Setting detail: SPECIMEN
Discharge: HOME | End: 2022-11-07
Payer: COMMERCIAL

## 2022-11-07 VITALS
DIASTOLIC BLOOD PRESSURE: 76 MMHG | HEIGHT: 64 IN | BODY MASS INDEX: 29.71 KG/M2 | SYSTOLIC BLOOD PRESSURE: 115 MMHG | WEIGHT: 174 LBS

## 2022-11-07 DIAGNOSIS — Z11.51 SPECIAL SCREENING EXAMINATION FOR HUMAN PAPILLOMAVIRUS (HPV): ICD-10-CM

## 2022-11-07 DIAGNOSIS — Z01.419 ENCOUNTER FOR ANNUAL ROUTINE GYNECOLOGICAL EXAMINATION: Primary | ICD-10-CM

## 2022-11-07 PROCEDURE — 88142 CYTOPATH C/V THIN LAYER: CPT

## 2022-11-07 PROCEDURE — 99395 PREV VISIT EST AGE 18-39: CPT | Performed by: OBSTETRICS & GYNECOLOGY

## 2022-11-07 PROCEDURE — 87624 HPV HI-RISK TYP POOLED RSLT: CPT

## 2022-11-07 SDOH — ECONOMIC STABILITY: FOOD INSECURITY: WITHIN THE PAST 12 MONTHS, YOU WORRIED THAT YOUR FOOD WOULD RUN OUT BEFORE YOU GOT MONEY TO BUY MORE.: NEVER TRUE

## 2022-11-07 SDOH — ECONOMIC STABILITY: TRANSPORTATION INSECURITY
IN THE PAST 12 MONTHS, HAS THE LACK OF TRANSPORTATION KEPT YOU FROM MEDICAL APPOINTMENTS OR FROM GETTING MEDICATIONS?: NO

## 2022-11-07 SDOH — ECONOMIC STABILITY: FOOD INSECURITY: WITHIN THE PAST 12 MONTHS, THE FOOD YOU BOUGHT JUST DIDN'T LAST AND YOU DIDN'T HAVE MONEY TO GET MORE.: NEVER TRUE

## 2022-11-07 SDOH — ECONOMIC STABILITY: TRANSPORTATION INSECURITY
IN THE PAST 12 MONTHS, HAS LACK OF TRANSPORTATION KEPT YOU FROM MEETINGS, WORK, OR FROM GETTING THINGS NEEDED FOR DAILY LIVING?: NO

## 2022-11-07 ASSESSMENT — PATIENT HEALTH QUESTIONNAIRE - PHQ9
2. FEELING DOWN, DEPRESSED OR HOPELESS: 0
SUM OF ALL RESPONSES TO PHQ QUESTIONS 1-9: 0
SUM OF ALL RESPONSES TO PHQ9 QUESTIONS 1 & 2: 0
SUM OF ALL RESPONSES TO PHQ QUESTIONS 1-9: 0
1. LITTLE INTEREST OR PLEASURE IN DOING THINGS: 0
SUM OF ALL RESPONSES TO PHQ QUESTIONS 1-9: 0
SUM OF ALL RESPONSES TO PHQ QUESTIONS 1-9: 0
1. LITTLE INTEREST OR PLEASURE IN DOING THINGS: 0
SUM OF ALL RESPONSES TO PHQ QUESTIONS 1-9: 0
SUM OF ALL RESPONSES TO PHQ9 QUESTIONS 1 & 2: 0
SUM OF ALL RESPONSES TO PHQ QUESTIONS 1-9: 0
2. FEELING DOWN, DEPRESSED OR HOPELESS: 0

## 2022-11-07 ASSESSMENT — ENCOUNTER SYMPTOMS
RESPIRATORY NEGATIVE: 1
GASTROINTESTINAL NEGATIVE: 1
EYES NEGATIVE: 1
ALLERGIC/IMMUNOLOGIC NEGATIVE: 1

## 2022-11-07 ASSESSMENT — SOCIAL DETERMINANTS OF HEALTH (SDOH): HOW HARD IS IT FOR YOU TO PAY FOR THE VERY BASICS LIKE FOOD, HOUSING, MEDICAL CARE, AND HEATING?: NOT HARD AT ALL

## 2022-11-07 NOTE — PROGRESS NOTES
22    Gerardo Aguilar  1985    Chief Complaint   Patient presents with    Gynecologic Exam     Annual exam, Pt states she always has irregular menses, LMP 10/7/22, is sexually active, had tubal ligation, Last pap smear was 2019 normal.         The patient is a 43 y.o. female,  who presents for her annual exam.  She is menstruating normally. She is  sexually active. She is currently taking birth control. Birth control method is sterilization    She reports no gynecological symptoms. Pap smear history: Her last PAP smear was in 2019. Her results were normal.    Past Medical History:   Diagnosis Date    Abnormal Pap smear of cervix     Chlamydia     Missed      Obesity     Seasonal allergies        Past Surgical History:   Procedure Laterality Date    CERVIX BIOPSY       SECTION, LOW TRANSVERSE  2014    SHOULDER SURGERY Left 2021    LEFT SHOULDER LIPOMA EXCISION performed by Tyson Richter MD at [address] EXTRACTION         Family History   Problem Relation Age of Onset    High Blood Pressure Father     Vision Loss Father     Heart Disease Father        Social History     Tobacco Use    Smoking status: Former    Smokeless tobacco: Never   Vaping Use    Vaping Use: Never used   Substance Use Topics    Alcohol use: Yes     Comment: occas    Drug use: No       Current Outpatient Medications   Medication Sig Dispense Refill    Multiple Vitamin (MULTI-VITAMIN DAILY PO) Take by mouth      b complex vitamins capsule Take 1 capsule by mouth daily      ibuprofen (ADVIL;MOTRIN) 800 MG tablet Take 1 tablet by mouth every 8 hours as needed for Pain 90 tablet 1    calcium carbonate (TUMS) 500 MG chewable tablet Take 1 tablet by mouth daily      prenatal vitamin (PRENATAL-S) 27-0.8 MG TABS Take 1 tablet by mouth daily. (Patient not taking: Reported on 2021)       No current facility-administered medications for this visit.        No Known Allergies        Immunization History   Administered Date(s) Administered    COVID-19, PFIZER PURPLE top, DILUTE for use, (age 15 y+), 30mcg/0.3mL 12/15/2020, 2021, 2021    Influenza Virus Vaccine 10/18/2019       Review of Systems   Constitutional: Negative. Eyes: Negative. Respiratory: Negative. Cardiovascular: Negative. Gastrointestinal: Negative. Endocrine: Negative. Genitourinary: Negative. Musculoskeletal: Negative. Skin: Negative. Allergic/Immunologic: Negative. Neurological: Negative. Hematological: Negative. Psychiatric/Behavioral: Negative. /76   Ht 5' 4\" (1.626 m)   Wt 174 lb (78.9 kg)   BMI 29.87 kg/m²     Physical Exam  Exam conducted with a chaperone present. Constitutional:       Appearance: Normal appearance. HENT:      Head: Normocephalic and atraumatic. Nose: Nose normal.   Eyes:      Conjunctiva/sclera: Conjunctivae normal.   Cardiovascular:      Rate and Rhythm: Normal rate. Pulses: Normal pulses. Pulmonary:      Effort: Pulmonary effort is normal.   Chest:   Breasts:     Right: Normal.      Left: Normal.   Abdominal:      General: Abdomen is flat. Bowel sounds are normal.      Palpations: Abdomen is soft. Hernia: There is no hernia in the left inguinal area or right inguinal area. Genitourinary:     General: Normal vulva. Exam position: Lithotomy position. Labia:         Right: No rash, tenderness or lesion. Left: No rash, tenderness or lesion. Urethra: No prolapse. Vagina: Normal. No foreign body. No vaginal discharge, erythema, tenderness, bleeding, lesions or prolapsed vaginal walls. Cervix: No cervical motion tenderness, discharge, friability, lesion, erythema or cervical bleeding. Uterus: Normal. Not enlarged, not tender and no uterine prolapse. Adnexa: Right adnexa normal and left adnexa normal.        Right: No mass, tenderness or fullness. Left: No mass, tenderness or fullness. Musculoskeletal:      Cervical back: Normal range of motion and neck supple. Lymphadenopathy:      Upper Body:      Right upper body: No supraclavicular, axillary or pectoral adenopathy. Left upper body: No supraclavicular, axillary or pectoral adenopathy. Skin:     General: Skin is warm. Coloration: Skin is not ashen or cyanotic. Findings: No abrasion, abscess or bruising. Rash is not crusting or macular. Neurological:      Mental Status: She is alert and oriented to person, place, and time. No results found for this visit on 11/07/22. Assessment and Plan   Diagnosis Orders   1. Encounter for annual routine gynecological examination  PAP SMEAR      2. Special screening examination for human papillomavirus (HPV)  PAP SMEAR          Return in about 1 year (around 11/7/2023).     Julian Orellnaa MD

## 2022-11-11 LAB
HPV HIGH RISK: NOT DETECTED
HPV, GENOTYPE 16: NOT DETECTED
HPV, GENOTYPE 18: NOT DETECTED

## 2023-09-12 SDOH — HEALTH STABILITY: PHYSICAL HEALTH: ON AVERAGE, HOW MANY MINUTES DO YOU ENGAGE IN EXERCISE AT THIS LEVEL?: 150+ MIN

## 2023-09-12 SDOH — HEALTH STABILITY: PHYSICAL HEALTH: ON AVERAGE, HOW MANY DAYS PER WEEK DO YOU ENGAGE IN MODERATE TO STRENUOUS EXERCISE (LIKE A BRISK WALK)?: 4 DAYS

## 2023-09-15 ENCOUNTER — OFFICE VISIT (OUTPATIENT)
Dept: INTERNAL MEDICINE CLINIC | Age: 38
End: 2023-09-15

## 2023-09-15 VITALS
BODY MASS INDEX: 32.07 KG/M2 | HEART RATE: 80 BPM | HEIGHT: 63 IN | WEIGHT: 181 LBS | DIASTOLIC BLOOD PRESSURE: 80 MMHG | OXYGEN SATURATION: 100 % | SYSTOLIC BLOOD PRESSURE: 146 MMHG

## 2023-09-15 DIAGNOSIS — Z76.89 ENCOUNTER TO ESTABLISH CARE: Primary | ICD-10-CM

## 2023-09-15 SDOH — ECONOMIC STABILITY: FOOD INSECURITY: WITHIN THE PAST 12 MONTHS, THE FOOD YOU BOUGHT JUST DIDN'T LAST AND YOU DIDN'T HAVE MONEY TO GET MORE.: NEVER TRUE

## 2023-09-15 SDOH — ECONOMIC STABILITY: FOOD INSECURITY: WITHIN THE PAST 12 MONTHS, YOU WORRIED THAT YOUR FOOD WOULD RUN OUT BEFORE YOU GOT MONEY TO BUY MORE.: NEVER TRUE

## 2023-09-15 SDOH — ECONOMIC STABILITY: HOUSING INSECURITY
IN THE LAST 12 MONTHS, WAS THERE A TIME WHEN YOU DID NOT HAVE A STEADY PLACE TO SLEEP OR SLEPT IN A SHELTER (INCLUDING NOW)?: NO

## 2023-09-15 SDOH — ECONOMIC STABILITY: INCOME INSECURITY: HOW HARD IS IT FOR YOU TO PAY FOR THE VERY BASICS LIKE FOOD, HOUSING, MEDICAL CARE, AND HEATING?: NOT HARD AT ALL

## 2023-09-15 ASSESSMENT — ENCOUNTER SYMPTOMS
EYE REDNESS: 0
SORE THROAT: 0
CONSTIPATION: 0
NAUSEA: 0
DIARRHEA: 0
SHORTNESS OF BREATH: 0
FACIAL SWELLING: 0
COUGH: 0
STRIDOR: 0
TROUBLE SWALLOWING: 0
WHEEZING: 0
ABDOMINAL PAIN: 0
VOMITING: 0
EYE PAIN: 0
EYE DISCHARGE: 0
CHEST TIGHTNESS: 0
CHOKING: 0
RHINORRHEA: 0
COLOR CHANGE: 0

## 2023-09-15 ASSESSMENT — PATIENT HEALTH QUESTIONNAIRE - PHQ9
2. FEELING DOWN, DEPRESSED OR HOPELESS: 0
SUM OF ALL RESPONSES TO PHQ QUESTIONS 1-9: 0
SUM OF ALL RESPONSES TO PHQ QUESTIONS 1-9: 0
SUM OF ALL RESPONSES TO PHQ9 QUESTIONS 1 & 2: 0
SUM OF ALL RESPONSES TO PHQ QUESTIONS 1-9: 0
1. LITTLE INTEREST OR PLEASURE IN DOING THINGS: 0
SUM OF ALL RESPONSES TO PHQ QUESTIONS 1-9: 0

## 2023-09-15 ASSESSMENT — VISUAL ACUITY: OU: 1

## 2023-11-11 SDOH — ECONOMIC STABILITY: INCOME INSECURITY: HOW HARD IS IT FOR YOU TO PAY FOR THE VERY BASICS LIKE FOOD, HOUSING, MEDICAL CARE, AND HEATING?: NOT HARD AT ALL

## 2023-11-11 SDOH — ECONOMIC STABILITY: FOOD INSECURITY: WITHIN THE PAST 12 MONTHS, THE FOOD YOU BOUGHT JUST DIDN'T LAST AND YOU DIDN'T HAVE MONEY TO GET MORE.: NEVER TRUE

## 2023-11-11 SDOH — ECONOMIC STABILITY: FOOD INSECURITY: WITHIN THE PAST 12 MONTHS, YOU WORRIED THAT YOUR FOOD WOULD RUN OUT BEFORE YOU GOT MONEY TO BUY MORE.: NEVER TRUE

## 2023-11-14 ENCOUNTER — OFFICE VISIT (OUTPATIENT)
Dept: OBGYN | Age: 38
End: 2023-11-14
Payer: COMMERCIAL

## 2023-11-14 VITALS
BODY MASS INDEX: 32.25 KG/M2 | HEIGHT: 63 IN | WEIGHT: 182 LBS | SYSTOLIC BLOOD PRESSURE: 158 MMHG | DIASTOLIC BLOOD PRESSURE: 92 MMHG

## 2023-11-14 DIAGNOSIS — Z12.31 SCREENING MAMMOGRAM FOR BREAST CANCER: ICD-10-CM

## 2023-11-14 DIAGNOSIS — R03.0 ELEVATED BLOOD PRESSURE READING: ICD-10-CM

## 2023-11-14 DIAGNOSIS — Z01.419 ENCOUNTER FOR ANNUAL ROUTINE GYNECOLOGICAL EXAMINATION: Primary | ICD-10-CM

## 2023-11-14 PROCEDURE — 99395 PREV VISIT EST AGE 18-39: CPT | Performed by: OBSTETRICS & GYNECOLOGY

## 2023-11-14 ASSESSMENT — ENCOUNTER SYMPTOMS
GASTROINTESTINAL NEGATIVE: 1
RESPIRATORY NEGATIVE: 1
ALLERGIC/IMMUNOLOGIC NEGATIVE: 1
EYES NEGATIVE: 1

## 2024-08-16 LAB
CHOLEST SERPL-MCNC: 151 MG/DL
GLUCOSE SERPL-MCNC: 85 MG/DL (ref 70–99)
HDLC SERPL-MCNC: 58 MG/DL
LDLC SERPL CALC-MCNC: 71 MG/DL
PATIENT FASTING?: YES
TRIGL SERPL-MCNC: 110 MG/DL

## 2024-11-18 SDOH — ECONOMIC STABILITY: FOOD INSECURITY: WITHIN THE PAST 12 MONTHS, THE FOOD YOU BOUGHT JUST DIDN'T LAST AND YOU DIDN'T HAVE MONEY TO GET MORE.: NEVER TRUE

## 2024-11-18 SDOH — ECONOMIC STABILITY: FOOD INSECURITY: WITHIN THE PAST 12 MONTHS, YOU WORRIED THAT YOUR FOOD WOULD RUN OUT BEFORE YOU GOT MONEY TO BUY MORE.: NEVER TRUE

## 2024-11-18 SDOH — ECONOMIC STABILITY: INCOME INSECURITY: HOW HARD IS IT FOR YOU TO PAY FOR THE VERY BASICS LIKE FOOD, HOUSING, MEDICAL CARE, AND HEATING?: NOT HARD AT ALL

## 2024-11-21 ENCOUNTER — OFFICE VISIT (OUTPATIENT)
Dept: OBGYN | Age: 39
End: 2024-11-21
Payer: COMMERCIAL

## 2024-11-21 VITALS
HEIGHT: 63 IN | WEIGHT: 187 LBS | SYSTOLIC BLOOD PRESSURE: 183 MMHG | BODY MASS INDEX: 33.13 KG/M2 | HEART RATE: 105 BPM | DIASTOLIC BLOOD PRESSURE: 96 MMHG

## 2024-11-21 DIAGNOSIS — N89.8 VAGINAL DRYNESS: ICD-10-CM

## 2024-11-21 DIAGNOSIS — R68.82 DECREASED LIBIDO: ICD-10-CM

## 2024-11-21 DIAGNOSIS — N92.1 MENOMETRORRHAGIA: ICD-10-CM

## 2024-11-21 DIAGNOSIS — Z12.31 SCREENING MAMMOGRAM FOR BREAST CANCER: ICD-10-CM

## 2024-11-21 DIAGNOSIS — Z01.419 ENCOUNTER FOR ANNUAL ROUTINE GYNECOLOGICAL EXAMINATION: Primary | ICD-10-CM

## 2024-11-21 PROCEDURE — 36415 COLL VENOUS BLD VENIPUNCTURE: CPT | Performed by: OBSTETRICS & GYNECOLOGY

## 2024-11-21 PROCEDURE — 99396 PREV VISIT EST AGE 40-64: CPT | Performed by: OBSTETRICS & GYNECOLOGY

## 2024-11-21 SDOH — ECONOMIC STABILITY: FOOD INSECURITY: WITHIN THE PAST 12 MONTHS, THE FOOD YOU BOUGHT JUST DIDN'T LAST AND YOU DIDN'T HAVE MONEY TO GET MORE.: NEVER TRUE

## 2024-11-21 SDOH — ECONOMIC STABILITY: INCOME INSECURITY: HOW HARD IS IT FOR YOU TO PAY FOR THE VERY BASICS LIKE FOOD, HOUSING, MEDICAL CARE, AND HEATING?: NOT HARD AT ALL

## 2024-11-21 SDOH — ECONOMIC STABILITY: FOOD INSECURITY: WITHIN THE PAST 12 MONTHS, YOU WORRIED THAT YOUR FOOD WOULD RUN OUT BEFORE YOU GOT MONEY TO BUY MORE.: NEVER TRUE

## 2024-11-21 ASSESSMENT — PATIENT HEALTH QUESTIONNAIRE - PHQ9
1. LITTLE INTEREST OR PLEASURE IN DOING THINGS: NOT AT ALL
SUM OF ALL RESPONSES TO PHQ QUESTIONS 1-9: 0
1. LITTLE INTEREST OR PLEASURE IN DOING THINGS: NOT AT ALL
SUM OF ALL RESPONSES TO PHQ9 QUESTIONS 1 & 2: 0
2. FEELING DOWN, DEPRESSED OR HOPELESS: NOT AT ALL
SUM OF ALL RESPONSES TO PHQ QUESTIONS 1-9: 0
SUM OF ALL RESPONSES TO PHQ9 QUESTIONS 1 & 2: 0
SUM OF ALL RESPONSES TO PHQ QUESTIONS 1-9: 0
2. FEELING DOWN, DEPRESSED OR HOPELESS: NOT AT ALL

## 2024-11-21 NOTE — PROGRESS NOTES
No mass, tenderness or fullness.          Left: No mass, tenderness or fullness.     Musculoskeletal:      Cervical back: Normal range of motion and neck supple.   Lymphadenopathy:      Upper Body:      Right upper body: No supraclavicular, axillary or pectoral adenopathy.      Left upper body: No supraclavicular, axillary or pectoral adenopathy.   Skin:     General: Skin is warm.      Coloration: Skin is not ashen or cyanotic.      Findings: No abrasion, abscess or bruising. Rash is not crusting or macular.   Neurological:      Mental Status: She is alert and oriented to person, place, and time.         No results found for this visit on 11/21/24.    ASSESSMENT AND PLAN   Diagnosis Orders   1. Encounter for annual routine gynecological examination        2. Screening mammogram for breast cancer  SAMANTA IRINEO DIGITAL SCREEN BILATERAL PER PROTOCOL      3. Menometrorrhagia  CBC    Prolactin    TSH with Reflex to FT4    Testosterone, free, total    Follicle Stimulating Hormone    HCG Qualitative, Serum    US NON OB TRANSVAGINAL    Estrogens, Fractionated      4. Decreased libido        5. Vaginal dryness            No follow-ups on file.    Jessee Amaro MD

## 2024-11-22 LAB
DEPRECATED RDW RBC AUTO: 12.6 % (ref 12.4–15.4)
FSH SERPL-ACNC: 7.9 MIU/ML
HCG SERPL QL: NEGATIVE
HCT VFR BLD AUTO: 41.8 % (ref 36–48)
HGB BLD-MCNC: 14.5 G/DL (ref 12–16)
MCH RBC QN AUTO: 31.9 PG (ref 26–34)
MCHC RBC AUTO-ENTMCNC: 34.6 G/DL (ref 31–36)
MCV RBC AUTO: 92.3 FL (ref 80–100)
PLATELET # BLD AUTO: 315 K/UL (ref 135–450)
PMV BLD AUTO: 10 FL (ref 5–10.5)
PROLACTIN SERPL IA-MCNC: 12 NG/ML
RBC # BLD AUTO: 4.54 M/UL (ref 4–5.2)
SHBG SERPL-SCNC: 43 NMOL/L (ref 25–122)
TESTOST FREE SERPL-MCNC: 5.8 PG/ML (ref 1.3–9.2)
TESTOST SERPL-MCNC: 38 NG/DL (ref 8–48)
TSH SERPL DL<=0.005 MIU/L-ACNC: 2.07 UIU/ML (ref 0.27–4.2)
WBC # BLD AUTO: 8.7 K/UL (ref 4–11)

## 2024-11-24 LAB
ESTRADIOL SERPL HS-MCNC: 208.4 PG/ML
ESTROGEN SERPL CALC-MCNC: 303.5 PG/ML
ESTRONE SERPL-MCNC: 95.1 PG/ML

## 2024-12-12 ENCOUNTER — OFFICE VISIT (OUTPATIENT)
Dept: OBGYN | Age: 39
End: 2024-12-12
Payer: COMMERCIAL

## 2024-12-12 VITALS
WEIGHT: 188 LBS | DIASTOLIC BLOOD PRESSURE: 97 MMHG | SYSTOLIC BLOOD PRESSURE: 157 MMHG | BODY MASS INDEX: 33.31 KG/M2 | HEIGHT: 63 IN

## 2024-12-12 DIAGNOSIS — N92.1 METRORRHAGIA: Primary | ICD-10-CM

## 2024-12-12 PROCEDURE — 99213 OFFICE O/P EST LOW 20 MIN: CPT | Performed by: OBSTETRICS & GYNECOLOGY

## 2024-12-12 NOTE — PROGRESS NOTES
ollected Updated Procedure    2024 1126 2024 0545 Estrogens, Fractionated [[id number]]   Blood    Component Value Units   Estradiol 208.4  pg/mL   Estrone 95.1  pg/mL   Estrogen Total 303.5  pg/mL          2024 1126 2024 0108 HCG Qualitative, Serum [[id number]]   Blood    Component Value   Preg, Serum Negative          2024 1126 2024 Follicle Stimulating Hormone [[id number]]   Blood    Component Value Units   FSH 7.9  mIU/mL          2024 1126 2024 1555 Testosterone, free, total [[id number]]   Blood    Component Value Units   Testosterone 38 ng/dL   Sex Hormone Binding 43 nmol/L   Testosterone, Free 5.8  pg/mL          2024 1126 2024 010 TSH with Reflex to FT4 [[id number]]   Blood    Component Value Units   TSH Reflex FT4 2.07 uIU/mL          2024 1126 2024 Prolactin [[id number]]   Blood    Component Value Units   Prolactin 12.0  ng/mL          2024 1126 2024 0257 CBC [[id number]]   Blood    Component Value Units   WBC 8.7 K/uL   RBC 4.54 M/uL   Hemoglobin 14.5 g/dL   Hematocrit 41.8 %   MCV 92.3 fL   MCH 31.9 pg   MCHC 34.6 g/dL   RDW 12.6 %   Platelets 315 K/uL   MPV 10.0 fL              24    Estefany Woodard  1985    Chief Complaint   Patient presents with    Menometrorrhagia     Pt here to discuss ultrasound and labs d/t Pt reporting irregular menses. Increase in clotting, duration of menses and dark red blood. Cramping is normal. Reports she has appt with new pcp in 2025 for BP        Estefany Woodard is a 39 y.o. female who presents today for evaluation of irregular menses.  Menses q 4-6 weeks/    Past Medical History:   Diagnosis Date    Abnormal Pap smear of cervix     Chlamydia     Missed      Obesity     Seasonal allergies        Past Surgical History:   Procedure Laterality Date    CERVIX BIOPSY       SECTION, LOW TRANSVERSE  2014    COSMETIC SURGERY

## 2025-01-07 ENCOUNTER — HOSPITAL ENCOUNTER (OUTPATIENT)
Dept: WOMENS IMAGING | Age: 40
Discharge: HOME | End: 2025-01-07
Payer: COMMERCIAL

## 2025-01-07 DIAGNOSIS — Z12.31 SCREENING MAMMOGRAM FOR BREAST CANCER: ICD-10-CM

## 2025-01-07 PROCEDURE — 77063 BREAST TOMOSYNTHESIS BI: CPT

## 2025-01-22 SDOH — HEALTH STABILITY: PHYSICAL HEALTH: ON AVERAGE, HOW MANY DAYS PER WEEK DO YOU ENGAGE IN MODERATE TO STRENUOUS EXERCISE (LIKE A BRISK WALK)?: 3 DAYS

## 2025-01-22 SDOH — HEALTH STABILITY: PHYSICAL HEALTH: ON AVERAGE, HOW MANY MINUTES DO YOU ENGAGE IN EXERCISE AT THIS LEVEL?: 50 MIN

## 2025-01-23 ENCOUNTER — OFFICE VISIT (OUTPATIENT)
Dept: FAMILY MEDICINE CLINIC | Age: 40
End: 2025-01-23

## 2025-01-23 VITALS
HEART RATE: 103 BPM | TEMPERATURE: 98.4 F | DIASTOLIC BLOOD PRESSURE: 76 MMHG | SYSTOLIC BLOOD PRESSURE: 136 MMHG | OXYGEN SATURATION: 99 % | BODY MASS INDEX: 32.17 KG/M2 | HEIGHT: 64 IN | WEIGHT: 188.4 LBS

## 2025-01-23 DIAGNOSIS — Z00.00 ANNUAL WELLNESS VISIT: Primary | ICD-10-CM

## 2025-01-23 DIAGNOSIS — R03.0 ELEVATED BLOOD PRESSURE READING IN OFFICE WITH WHITE COAT SYNDROME, WITHOUT DIAGNOSIS OF HYPERTENSION: ICD-10-CM

## 2025-01-23 PROBLEM — O34.219 PREVIOUS CESAREAN DELIVERY AFFECTING PREGNANCY: Status: RESOLVED | Noted: 2018-01-06 | Resolved: 2025-01-23

## 2025-01-23 RX ORDER — MAGNESIUM GLUCONATE 27 MG(500)
500 TABLET ORAL 2 TIMES DAILY
COMMUNITY
End: 2025-01-23

## 2025-01-23 SDOH — ECONOMIC STABILITY: FOOD INSECURITY: WITHIN THE PAST 12 MONTHS, YOU WORRIED THAT YOUR FOOD WOULD RUN OUT BEFORE YOU GOT MONEY TO BUY MORE.: NEVER TRUE

## 2025-01-23 SDOH — ECONOMIC STABILITY: FOOD INSECURITY: WITHIN THE PAST 12 MONTHS, THE FOOD YOU BOUGHT JUST DIDN'T LAST AND YOU DIDN'T HAVE MONEY TO GET MORE.: NEVER TRUE

## 2025-01-23 ASSESSMENT — PATIENT HEALTH QUESTIONNAIRE - PHQ9
2. FEELING DOWN, DEPRESSED OR HOPELESS: NOT AT ALL
SUM OF ALL RESPONSES TO PHQ QUESTIONS 1-9: 0
SUM OF ALL RESPONSES TO PHQ9 QUESTIONS 1 & 2: 0
1. LITTLE INTEREST OR PLEASURE IN DOING THINGS: NOT AT ALL
SUM OF ALL RESPONSES TO PHQ QUESTIONS 1-9: 0

## 2025-01-23 NOTE — PROGRESS NOTES
Subjective:   Estefany Woodard (:  1985) is a 39 y.o. female, here for adult annual preventative visit    I reviewed and updated patient's medications & allergies, problem list, past medical/surgical/social & family histories as indicated by the Corby As Reviewed time-stamps.    RISK ASSESSMENT:  Activity habits: exercises four times weekly about 200 mins, runs, swims. 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  Lives with  and two kids    Family history of DM, HTN, CAD, sudden death? CAD,HTN and glaucoma in Dad    Substance use : None    GYN HX:   Patient's last menstrual period was 2025.  Periods are irregular.  Contraception: none  Sexual history: Currently sexually active, monogamous relationship with    Family History of Breast, Ovarian , Colon or Uterine Cancer: No    SUBJECTIVE  Concerned about elevated BP readings at clinic visits. She checks her BP at home and averages around 125 SBP. Review of clinic visits shows SBP between 140-180 in last three months.   Has hx of metrorrhagia, follows GYN    Tobacco use:  reports that she has quit smoking. Her smoking use included cigarettes. She has never used smokeless tobacco.  Alcohol use:  reports current alcohol use.  Past Medical History:   Diagnosis Date    Abnormal Pap smear of cervix     Chlamydia     Missed      Obesity     Seasonal allergies      Past Surgical History:   Procedure Laterality Date    CERVIX BIOPSY       SECTION, LOW TRANSVERSE  2014    COSMETIC SURGERY      Lipoma removal left shoulder    SHOULDER SURGERY Left 2021    LEFT SHOULDER LIPOMA EXCISION performed by Malou Lepe MD at [facility] OR    WISDOM TOOTH EXTRACTION       No Known Allergies  Family History   Problem Relation Age of Onset    High Blood Pressure Father     Vision Loss Father     Heart

## 2025-01-23 NOTE — ASSESSMENT & PLAN NOTE
Home BP check technique reviewed and appropriate. I have asked her to continue home BP check and keep log. She will come back to the clinic in a week for BP check. If elevations persist, since ambulatory BP monitoring is unavailable, I will consider an EKG and possibly ECHO to rule out LVH.

## 2025-01-30 ENCOUNTER — OFFICE VISIT (OUTPATIENT)
Dept: FAMILY MEDICINE CLINIC | Age: 40
End: 2025-01-30

## 2025-01-30 VITALS
HEART RATE: 84 BPM | BODY MASS INDEX: 32.17 KG/M2 | WEIGHT: 187.4 LBS | OXYGEN SATURATION: 99 % | SYSTOLIC BLOOD PRESSURE: 140 MMHG | DIASTOLIC BLOOD PRESSURE: 80 MMHG

## 2025-01-30 DIAGNOSIS — R03.0 ELEVATED BLOOD PRESSURE READING IN OFFICE WITH WHITE COAT SYNDROME, WITHOUT DIAGNOSIS OF HYPERTENSION: Primary | ICD-10-CM

## 2025-01-30 NOTE — PROGRESS NOTES
Seen for BP check today  Here with home machine, similar readings with office equipment  Reviewed home readings, average -120 and DBP 70s  Likely this is white coat HTN but will obtain EKG to r/o LVH  No need for treatment at this time.

## 2025-08-01 DIAGNOSIS — R03.0 ELEVATED BLOOD PRESSURE READING IN OFFICE WITH WHITE COAT SYNDROME, WITHOUT DIAGNOSIS OF HYPERTENSION: Primary | ICD-10-CM

## 2025-08-11 ENCOUNTER — HOSPITAL ENCOUNTER (OUTPATIENT)
Dept: GENERAL RADIOLOGY | Age: 40
Discharge: HOME | End: 2025-08-11
Payer: COMMERCIAL

## 2025-08-11 LAB
EKG ATRIAL RATE: 76 BPM
EKG DIAGNOSIS: NORMAL
EKG P AXIS: 114 DEGREES
EKG P-R INTERVAL: 106 MS
EKG Q-T INTERVAL: 382 MS
EKG QRS DURATION: 86 MS
EKG QTC CALCULATION (BAZETT): 429 MS
EKG R AXIS: 129 DEGREES
EKG T AXIS: 118 DEGREES
EKG VENTRICULAR RATE: 76 BPM

## 2025-08-11 PROCEDURE — 93010 ELECTROCARDIOGRAM REPORT: CPT | Performed by: INTERNAL MEDICINE

## 2025-08-11 PROCEDURE — 93005 ELECTROCARDIOGRAM TRACING: CPT | Performed by: STUDENT IN AN ORGANIZED HEALTH CARE EDUCATION/TRAINING PROGRAM

## 2025-08-22 ENCOUNTER — TELEPHONE (OUTPATIENT)
Dept: CARDIOLOGY CLINIC | Age: 40
End: 2025-08-22

## 2025-08-22 ENCOUNTER — OFFICE VISIT (OUTPATIENT)
Dept: FAMILY MEDICINE CLINIC | Age: 40
End: 2025-08-22

## 2025-08-22 VITALS
SYSTOLIC BLOOD PRESSURE: 148 MMHG | BODY MASS INDEX: 29.97 KG/M2 | WEIGHT: 174.6 LBS | DIASTOLIC BLOOD PRESSURE: 76 MMHG | OXYGEN SATURATION: 100 % | HEART RATE: 86 BPM

## 2025-08-22 DIAGNOSIS — Z82.49 FAMILY HISTORY OF ASCVD (ARTERIOSCLEROTIC CARDIOVASCULAR DISEASE): ICD-10-CM

## 2025-08-22 DIAGNOSIS — R03.0 ELEVATED BLOOD PRESSURE READING IN OFFICE WITH WHITE COAT SYNDROME, WITHOUT DIAGNOSIS OF HYPERTENSION: Primary | ICD-10-CM

## (undated) DEVICE — SHEET, T, LAPAROTOMY, STERILE: Brand: MEDLINE

## (undated) DEVICE — DRAPE SHEET ULTRAGARD: Brand: MEDLINE

## (undated) DEVICE — INTENDED FOR TISSUE SEPARATION, AND OTHER PROCEDURES THAT REQUIRE A SHARP SURGICAL BLADE TO PUNCTURE OR CUT.: Brand: BARD-PARKER ® STAINLESS STEEL BLADES

## (undated) DEVICE — TOWEL,OR,DSP,ST,BLUE,STD,6/PK,12PK/CS: Brand: MEDLINE

## (undated) DEVICE — MARKER SURG SKIN UTIL REGULAR/FINE 2 TIP W/ RUL AND 9 LBL

## (undated) DEVICE — ELECTRODE ES AD CRDLSS PT RET REM POLYHESIVE

## (undated) DEVICE — GLOVE SURG SZ 6 THK91MIL LTX FREE SYN POLYISOPRENE ANTI

## (undated) DEVICE — BANDAGE,GAUZE,BULKEE II,4.5"X4.1YD,STRL: Brand: MEDLINE

## (undated) DEVICE — SPONGE GZ W4XL8IN COT WVN 12 PLY

## (undated) DEVICE — LINER,SEMI-RIGID,3000CC,50EA/CS: Brand: MEDLINE

## (undated) DEVICE — GLOVE ORANGE PI 7 1/2   MSG9075

## (undated) DEVICE — GAUZE,SPONGE,4"X4",16PLY,XRAY,STRL,LF: Brand: MEDLINE

## (undated) DEVICE — TUBE CULTURE LF UNIFORM BOTTOM STER

## (undated) DEVICE — CHLORAPREP 26ML ORANGE

## (undated) DEVICE — SOLUTION IV IRRIG WATER 1000ML POUR BRL 2F7114

## (undated) DEVICE — COUNTER NDL 30 COUNT FOAM STRP SGL MAG

## (undated) DEVICE — SPONGE LAP W18XL18IN WHT COT 4 PLY FLD STRUNG RADPQ DISP ST

## (undated) DEVICE — PACK,BASIC,IX: Brand: MEDLINE

## (undated) DEVICE — TUBING, SUCTION, 9/32" X 10', STRAIGHT: Brand: MEDLINE